# Patient Record
Sex: FEMALE | Race: WHITE | NOT HISPANIC OR LATINO | URBAN - METROPOLITAN AREA
[De-identification: names, ages, dates, MRNs, and addresses within clinical notes are randomized per-mention and may not be internally consistent; named-entity substitution may affect disease eponyms.]

---

## 2018-12-12 NOTE — PATIENT DISCUSSION
CLEARED FOR PHACO OU. VISUALLY SIGNIFICANT [facility] OU. RETURN TO DR WHITAKER FOR PHAJULIO CÉSAR MAX.

## 2018-12-12 NOTE — PATIENT DISCUSSION
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 DISCUSSED RISKS/BENEFITS OF VITRECTOMY TO INCLUDE RISK OF RETINAL TEAR, RETINAL DETACHMENT, AND ENDOPHTHALMITIS. PATIENT ELECTED TO OBSERVE. RETURN TO DR JOHNATHAN KELSEY.

## 2022-04-14 NOTE — PATIENT DISCUSSION
Risks, benefits, limitations, and alternatives of cataract extraction discussed with patient, including but not limited to: bleeding, infection, acute or chronic intraocular inflammation, retinal hole/tear/detachment, increased or decreased intraocular pressure, macular edema, corneal edema, posterior capsule opacification, ptosis, irregular pupil, no improvement in vision, worsened vision, and need for additional surgery. Patient understands the risks and wishes to proceed.

## 2022-04-14 NOTE — PATIENT DISCUSSION
Patient is a candidate for the custom package but not for the advanced package. Do not feel that ORA is necessary because of the accuracy of AVT.

## 2022-04-14 NOTE — PATIENT DISCUSSION
The patient feels that the cataract is significantly impacting daily activities and has elected cataract surgery. The risks, benefits, and alternatives to surgery were discussed.  The patient elects to proceed with surgery OS first the OD if symptoms persist.

## 2022-06-15 ENCOUNTER — NEW PATIENT (OUTPATIENT)
Dept: URBAN - METROPOLITAN AREA CLINIC 52 | Facility: CLINIC | Age: 52
End: 2022-06-15

## 2022-06-15 DIAGNOSIS — H52.4: ICD-10-CM

## 2022-06-15 DIAGNOSIS — E11.9: ICD-10-CM

## 2022-06-15 DIAGNOSIS — H40.053: ICD-10-CM

## 2022-06-15 PROCEDURE — 92015 DETERMINE REFRACTIVE STATE: CPT

## 2022-06-15 PROCEDURE — 76514 ECHO EXAM OF EYE THICKNESS: CPT

## 2022-06-15 PROCEDURE — 92004 COMPRE OPH EXAM NEW PT 1/>: CPT

## 2022-06-15 RX ORDER — OLOPATADINE HYDROCHLORIDE 2 MG/ML: 1 SOLUTION OPHTHALMIC EVERY EVENING

## 2022-06-15 ASSESSMENT — VISUAL ACUITY
OU_SC: 20/20-2
OS_GLARE: 20/25
OD_SC: 20/40-2
OD_GLARE: 20/40
OS_SC: 20/30+2
OS_PH: 20/20
OD_GLARE: 20/30
OU_CC: J1+ SLOW
OD_PH: 20/20
OS_GLARE: 20/25

## 2022-06-15 ASSESSMENT — KERATOMETRY
OD_AXISANGLE2_DEGREES: 104
OS_K1POWER_DIOPTERS: 46.00
OS_K2POWER_DIOPTERS: 46.25
OS_AXISANGLE2_DEGREES: 72
OD_K2POWER_DIOPTERS: 46.00
OS_AXISANGLE_DEGREES: 162
OD_K1POWER_DIOPTERS: 45.50
OD_AXISANGLE_DEGREES: 014

## 2022-06-15 ASSESSMENT — TONOMETRY
OS_IOP_MMHG: 21
OD_IOP_MMHG: 21
OS_IOP_MMHG: 19
OD_IOP_MMHG: 19

## 2022-06-15 ASSESSMENT — PACHYMETRY
OD_CT_UM: 603
OS_CT_UM: 592

## 2023-07-17 ENCOUNTER — COMPREHENSIVE EXAM (OUTPATIENT)
Dept: URBAN - METROPOLITAN AREA CLINIC 48 | Facility: LOCATION | Age: 53
End: 2023-07-17

## 2023-07-17 DIAGNOSIS — Z01.01: ICD-10-CM

## 2023-07-17 DIAGNOSIS — H11.153: ICD-10-CM

## 2023-07-17 DIAGNOSIS — H52.4: ICD-10-CM

## 2023-07-17 DIAGNOSIS — E11.9: ICD-10-CM

## 2023-07-17 DIAGNOSIS — H04.123: ICD-10-CM

## 2023-07-17 PROCEDURE — 92015 DETERMINE REFRACTIVE STATE: CPT

## 2023-07-17 PROCEDURE — 92014 COMPRE OPH EXAM EST PT 1/>: CPT

## 2023-07-17 RX ORDER — CYCLOSPORINE 0.5 MG/ML: 1 EMULSION OPHTHALMIC TWICE A DAY

## 2023-07-17 ASSESSMENT — KERATOMETRY
OS_AXISANGLE2_DEGREES: 72
OS_K2POWER_DIOPTERS: 46.25
OD_AXISANGLE_DEGREES: 014
OD_AXISANGLE2_DEGREES: 104
OS_AXISANGLE_DEGREES: 162
OD_K1POWER_DIOPTERS: 45.50
OD_K2POWER_DIOPTERS: 46.00
OS_K1POWER_DIOPTERS: 46.00

## 2023-07-17 ASSESSMENT — VISUAL ACUITY
OD_PH: 20/25-2
OU_CC: J1 @16IN
OS_SC: 20/25
OS_GLARE: 20/20-1
OD_SC: 20/30-2 PUSH
OD_GLARE: 20/20

## 2023-07-17 ASSESSMENT — TONOMETRY
OD_IOP_MMHG: 18
OS_IOP_MMHG: 13
OS_IOP_MMHG: 17
OD_IOP_MMHG: 14